# Patient Record
Sex: MALE | Race: WHITE | NOT HISPANIC OR LATINO | Employment: FULL TIME | URBAN - NONMETROPOLITAN AREA
[De-identification: names, ages, dates, MRNs, and addresses within clinical notes are randomized per-mention and may not be internally consistent; named-entity substitution may affect disease eponyms.]

---

## 2020-10-08 NOTE — PROGRESS NOTES
"Subjective     Teddy Donis is a 29 year old male who presents to clinic today for the following health issues:    HPI         New Patient/Transfer of Care    Musculoskeletal problem/pain      Duration: years    Description  Location: bilateral wrist/hand    Intensity:  moderate    Accompanying signs and symptoms: numbness and trouble grasping    History  Previous similar problem: no   Previous evaluation:  none    Precipitating or alleviating factors:  Trauma or overuse: YES-overuse  Aggravating factors include: overuse    Therapies tried and outcome: nothing           Review of Systems   Constitutional, HEENT, cardiovascular, pulmonary, gi and gu systems are negative, except as otherwise noted.      Objective    /60   Pulse 73   Temp 97.3  F (36.3  C)   Ht 1.854 m (6' 1\")   Wt 106.6 kg (235 lb)   SpO2 99%   BMI 31.00 kg/m    Body mass index is 31 kg/m .  Physical Exam   GENERAL: healthy, alert and no distress  NECK: no adenopathy, no asymmetry, masses, or scars and thyroid normal to palpation  RESP: lungs clear to auscultation - no rales, rhonchi or wheezes  CV: regular rate and rhythm, normal S1 S2, no S3 or S4, no murmur, click or rub, no peripheral edema and peripheral pulses strong  ABDOMEN: soft, nontender, no hepatosplenomegaly, no masses and bowel sounds normal  MS: no gross musculoskeletal defects noted, no edema    Labs pending.         Assessment & Plan     Encounter for vasectomy  Recommend Dr. Og.  Appt. Made.    - UROLOGY ADULT REFERRAL; Future    Family history of ischemic heart disease  Reviewed major risk factors.  Stressed the tobacco.  Getting non fasting labs.    - Lipid Profile (Chol, Trig, HDL, LDL calc)  - Comprehensive metabolic panel (BMP + Alb, Alk Phos, ALT, AST, Total. Bili, TP)    Tobacco abuse  Stressed and sent the lozenges.    - nicotine (NICORETTE) 4 MG lozenge; Place 1 lozenge (4 mg) inside cheek as needed for smoking cessation    Bilateral hand pain  Reviewed at " "some length.  More of an arthritic, but consider CTS.  With am sx most significant, r/o RA as well as consider CTS and OA.  Educated on this, offered emg, night splint, and he will just try and figure out the problem first.    - ESR: Erythrocyte sedimentation rate  - Cyclic Citrullinated Peptide Antibody IgG  - Rheumatoid factor     BMI:   Estimated body mass index is 31 kg/m  as calculated from the following:    Height as of this encounter: 1.854 m (6' 1\").    Weight as of this encounter: 106.6 kg (235 lb).                No follow-ups on file.    Martin Greenfield MD  [facility]    "

## 2020-10-20 ENCOUNTER — OFFICE VISIT (OUTPATIENT)
Dept: FAMILY MEDICINE | Facility: OTHER | Age: 30
End: 2020-10-20
Attending: FAMILY MEDICINE
Payer: COMMERCIAL

## 2020-10-20 VITALS
OXYGEN SATURATION: 99 % | SYSTOLIC BLOOD PRESSURE: 106 MMHG | TEMPERATURE: 97.3 F | BODY MASS INDEX: 31.14 KG/M2 | HEIGHT: 73 IN | DIASTOLIC BLOOD PRESSURE: 60 MMHG | HEART RATE: 73 BPM | WEIGHT: 235 LBS

## 2020-10-20 DIAGNOSIS — M79.641 BILATERAL HAND PAIN: ICD-10-CM

## 2020-10-20 DIAGNOSIS — Z72.0 TOBACCO ABUSE: ICD-10-CM

## 2020-10-20 DIAGNOSIS — M79.642 BILATERAL HAND PAIN: ICD-10-CM

## 2020-10-20 DIAGNOSIS — Z30.2 ENCOUNTER FOR VASECTOMY: Primary | ICD-10-CM

## 2020-10-20 DIAGNOSIS — Z82.49 FAMILY HISTORY OF ISCHEMIC HEART DISEASE: ICD-10-CM

## 2020-10-20 LAB — ERYTHROCYTE [SEDIMENTATION RATE] IN BLOOD BY WESTERGREN METHOD: 1 MM/H (ref 0–15)

## 2020-10-20 PROCEDURE — 86200 CCP ANTIBODY: CPT | Performed by: FAMILY MEDICINE

## 2020-10-20 PROCEDURE — 99204 OFFICE O/P NEW MOD 45 MIN: CPT | Performed by: FAMILY MEDICINE

## 2020-10-20 PROCEDURE — 80061 LIPID PANEL: CPT | Performed by: FAMILY MEDICINE

## 2020-10-20 PROCEDURE — 86431 RHEUMATOID FACTOR QUANT: CPT | Performed by: FAMILY MEDICINE

## 2020-10-20 PROCEDURE — 36415 COLL VENOUS BLD VENIPUNCTURE: CPT | Performed by: FAMILY MEDICINE

## 2020-10-20 PROCEDURE — 80053 COMPREHEN METABOLIC PANEL: CPT | Performed by: FAMILY MEDICINE

## 2020-10-20 PROCEDURE — 85652 RBC SED RATE AUTOMATED: CPT | Performed by: FAMILY MEDICINE

## 2020-10-20 SDOH — HEALTH STABILITY: MENTAL HEALTH: HOW OFTEN DO YOU HAVE 6 OR MORE DRINKS ON ONE OCCASION?: WEEKLY

## 2020-10-20 SDOH — HEALTH STABILITY: MENTAL HEALTH: HOW MANY STANDARD DRINKS CONTAINING ALCOHOL DO YOU HAVE ON A TYPICAL DAY?: NOT ASKED

## 2020-10-20 SDOH — HEALTH STABILITY: MENTAL HEALTH: HOW OFTEN DO YOU HAVE A DRINK CONTAINING ALCOHOL?: NOT ASKED

## 2020-10-20 ASSESSMENT — MIFFLIN-ST. JEOR: SCORE: 2089.83

## 2020-10-20 ASSESSMENT — PAIN SCALES - GENERAL: PAINLEVEL: NO PAIN (0)

## 2020-10-20 NOTE — NURSING NOTE
"Chief Complaint   Patient presents with     Establish Care       Initial /60   Pulse 73   Temp 97.3  F (36.3  C)   Ht 1.854 m (6' 1\")   Wt 106.6 kg (235 lb)   SpO2 99%   BMI 31.00 kg/m   Estimated body mass index is 31 kg/m  as calculated from the following:    Height as of this encounter: 1.854 m (6' 1\").    Weight as of this encounter: 106.6 kg (235 lb).  Medication Reconciliation: complete  Amy Keene LPN  "

## 2020-10-21 LAB
ALBUMIN SERPL-MCNC: 4.2 G/DL (ref 3.4–5)
ALP SERPL-CCNC: 46 U/L (ref 40–150)
ALT SERPL W P-5'-P-CCNC: 36 U/L (ref 0–70)
ANION GAP SERPL CALCULATED.3IONS-SCNC: 6 MMOL/L (ref 3–14)
AST SERPL W P-5'-P-CCNC: 13 U/L (ref 0–45)
BILIRUB SERPL-MCNC: 0.6 MG/DL (ref 0.2–1.3)
BUN SERPL-MCNC: 12 MG/DL (ref 7–30)
CALCIUM SERPL-MCNC: 8.8 MG/DL (ref 8.5–10.1)
CHLORIDE SERPL-SCNC: 107 MMOL/L (ref 94–109)
CHOLEST SERPL-MCNC: 137 MG/DL
CO2 SERPL-SCNC: 26 MMOL/L (ref 20–32)
CREAT SERPL-MCNC: 0.87 MG/DL (ref 0.66–1.25)
GFR SERPL CREATININE-BSD FRML MDRD: >90 ML/MIN/{1.73_M2}
GLUCOSE SERPL-MCNC: 85 MG/DL (ref 70–99)
HDLC SERPL-MCNC: 48 MG/DL
LDLC SERPL CALC-MCNC: 63 MG/DL
NONHDLC SERPL-MCNC: 89 MG/DL
POTASSIUM SERPL-SCNC: 3.7 MMOL/L (ref 3.4–5.3)
PROT SERPL-MCNC: 7.5 G/DL (ref 6.8–8.8)
SODIUM SERPL-SCNC: 139 MMOL/L (ref 133–144)
TRIGL SERPL-MCNC: 130 MG/DL

## 2020-10-22 LAB
CCP AB SER IA-ACNC: 1 U/ML
RHEUMATOID FACT SER NEPH-ACNC: <7 IU/ML (ref 0–20)

## 2020-11-02 ENCOUNTER — NURSE TRIAGE (OUTPATIENT)
Dept: FAMILY MEDICINE | Facility: OTHER | Age: 30
End: 2020-11-02

## 2020-11-02 ENCOUNTER — OFFICE VISIT (OUTPATIENT)
Dept: FAMILY MEDICINE | Facility: OTHER | Age: 30
End: 2020-11-02
Attending: FAMILY MEDICINE
Payer: COMMERCIAL

## 2020-11-02 DIAGNOSIS — Z20.822 COVID-19 RULED OUT: Primary | ICD-10-CM

## 2020-11-02 DIAGNOSIS — Z20.822 COVID-19 RULED OUT: ICD-10-CM

## 2020-11-02 PROCEDURE — U0003 INFECTIOUS AGENT DETECTION BY NUCLEIC ACID (DNA OR RNA); SEVERE ACUTE RESPIRATORY SYNDROME CORONAVIRUS 2 (SARS-COV-2) (CORONAVIRUS DISEASE [COVID-19]), AMPLIFIED PROBE TECHNIQUE, MAKING USE OF HIGH THROUGHPUT TECHNOLOGIES AS DESCRIBED BY CMS-2020-01-R: HCPCS | Performed by: FAMILY MEDICINE

## 2020-11-02 NOTE — TELEPHONE ENCOUNTER
"Cough (productive) and sinus congestion, no drainage. Pt's work is requesting for pt to be tested for covid 19.     Symptoms starting on 10/28/20.    Covid testing scheduled:    Next 5 appointments (look out 90 days)    Nov 02, 2020 10:10 AM  (Arrive by 9:55 AM)  [facility]  [facility] ) [address]  [facility] [address]  [phone]        Discharge Instructions for COVID-19 Patients  You have--or may have--COVID-19. Please follow the instructions listed below.   If you have a weakened immune system, discuss with your doctor any other actions you need to take.  How can I protect others?  If you have symptoms (fever, cough, body aches or trouble breathing):    Stay home and away from others (self-isolate) until:  ? At least 10 days have passed since your symptoms started (if positive for covid 19)  And   ? You've had no fever--and no medicine that reduces fever--for 1 full day (24 hours), And    ? Your other symptoms have resolved (gotten better).  If you don't show symptoms, but testing showed that you have COVID-19:    Stay home and away from others (self-isolate). Follow the tips under \"How do I self-isolate?\" below for 10 days (20 days if you have a weak immune system).    You don't need to be retested for COVID-19 before going back to school or work. As long as you're fever-free and feeling better, you can go back to school, work and other activities after waiting the 10 or 20 days.   How do I self-isolate?    Stay in your own room, even for meals. Use your own bathroom if you can.    Stay away from others in your home. No hugging, kissing or shaking hands. No visitors.    Don't go to work, school or anywhere else.    Clean \"high touch\" surfaces often (doorknobs, counters, handles). Use household cleaning spray or wipes. You'll find a full list of  on the EPA website: " www.epa.gov/pesticide-registration/list-n-disinfectants-use-against-sars-cov-2.    Cover your mouth and nose with a mask or other face covering to avoid spreading germs.    Wash your hands and face often. Use soap and water.    Caregivers in these groups are at risk for severe illness due to COVID-19:  ? People 65 years and older  ? People who live in a nursing home or long-term care facility  ? People with chronic disease (lung, heart, cancer, diabetes, kidney, liver, immunologic)  ? People who have a weakened immune system, including those who:    Are in cancer treatment    Take medicine that weakens the immune system, such as corticosteroids    Had a bone marrow or organ transplant    Have an immune deficiency    Have poorly controlled HIV or AIDS    Are obese (body mass index of 40 or higher)    Smoke regularly    Caregivers should wear gloves while washing dishes, handling laundry and cleaning bedrooms and bathrooms.    Use caution when washing and drying laundry: Don't shake dirty laundry and use the warmest water setting that you can.    For more tips on managing your health at home, go to www.cdc.gov/coronavirus/2019-ncov/downloads/10Things.pdf.  How can I take care of myself at home?  1. Get lots of rest. Drink extra fluids (unless a doctor has told you not to).    2. Take Tylenol (acetaminophen) for fever or pain. If you have liver or kidney problems, ask your family doctor if it's okay to take Tylenol.     Adults can take either:  ? 650 mg (two 325 mg pills) every 4 to 6 hours, or   ? 1,000 mg (two 500 mg pills) every 8 hours as needed.  ? Note: Don't take more than 3,000 mg in one day. Acetaminophen is found in many medicines (both prescribed and over-the-counter medicines). Read all labels to be sure you don't take too much.   For children, check the Tylenol bottle for the right dose. The dose is based on the child's age or weight.  3. If you have other health problems (like cancer, heart failure, an  organ transplant or severe kidney disease): Call your specialty clinic if you don't feel better in the next 2 days.    4. Know when to call 911. Emergency warning signs include:  ? Trouble breathing or shortness of breath  ? Pain or pressure in the chest that doesn't go away  ? Feeling confused like you haven't felt before, or not being able to wake up  ? Bluish-colored lips or face    5. Your doctor may have prescribed a blood thinner medicine. Follow their instructions.  Where can I get more information?    [facility] - About COVID-19: [email or web address]    CDC - What to Do If You're Sick: www.cdc.gov/coronavirus/2019-ncov/about/steps-when-sick.html    CDC - Ending Home Isolation: www.cdc.gov/coronavirus/2019-ncov/hcp/disposition-in-home-patients.html    CDC - Caring for Someone: www.cdc.gov/coronavirus/2019-ncov/if-you-are-sick/care-for-someone.html    [facility] - Interim Guidance for Hospital Discharge to Home: www.[facility].[place]./diseases/coronavirus/hcp/hospdischarge.pdf    [facility] clinical trials (COVID-19 research studies): clinicalaffairs.[facility]-clinical-trials    Below are the COVID-19 hotlines at the [place] Department of Health ([facility]). Interpreters are available.  ? For health questions: Call 653-296-3480 or 1-600.724.3117 (7 a.m. to 7 p.m.)  ? For questions about schools and childcare: Call 638-195-9490 or 1-820.758.8432 (7 a.m. to 7 p.m.)    For informational purposes only. Not to replace the advice of your health care provider. Clinically reviewed by the Infection Prevention Team. Copyright   2020 [employer]. All rights reserved. [employer] 279776 - REV 08/04/20.      Reason for Disposition    COVID-19 Home Isolation, questions about    Additional Information    Negative: SEVERE difficulty breathing (e.g., struggling for each breath, speaks in single words)    Negative: Difficult to awaken or acting confused (e.g., disoriented, slurred speech)     Negative: Bluish (or gray) lips or face now    Negative: Shock suspected (e.g., cold/pale/clammy skin, too weak to stand, low BP, rapid pulse)    Negative: Sounds like a life-threatening emergency to the triager    Negative: [1] COVID-19 exposure AND [2] no symptoms    Negative: COVID-19 and Breastfeeding, questions about    Negative: [1] Adult with possible COVID-19 symptoms AND [2] triager concerned about severity of symptoms or other causes    Negative: Patient sounds very sick or weak to the triager    Negative: SEVERE or constant chest pain or pressure (Exception: mild central chest pain, present only when coughing)    Negative: MODERATE difficulty breathing (e.g., speaks in phrases, SOB even at rest, pulse 100-120)    Negative: MILD difficulty breathing (e.g., minimal/no SOB at rest, SOB with walking, pulse <100)    Negative: Chest pain or pressure    Negative: Fever > 103 F (39.4 C)    Negative: [1] Fever > 101 F (38.3 C) AND [2] age > 60    Negative: [1] Fever > 100.0 F (37.8 C) AND [2] bedridden (e.g., nursing home patient, CVA, chronic illness, recovering from surgery)    Negative: HIGH RISK patient (e.g., age > 64 years, diabetes, heart or lung disease, weak immune system) (Exception: Has already been evaluated by healthcare provider and has no new or worsening symptoms)    Negative: Fever present > 3 days (72 hours)    Negative: [1] Fever returns after gone for over 24 hours AND [2] symptoms worse or not improved    Negative: [1] Continuous (nonstop) coughing interferes with work or school AND [2] no improvement using cough treatment per protocol    Negative: [1] COVID-19 infection suspected by caller or triager AND [2] mild symptoms (cough, fever, or others) AND [3] no complications or SOB    Negative: Cough present > 3 weeks    Negative: [1] COVID-19 diagnosed by positive lab test AND [2] mild symptoms (e.g., cough, fever, others) AND [3] no complications or SOB    Negative: [1] COVID-19 diagnosed by  "HCP (doctor, NP or PA) AND [2] mild symptoms (e.g., cough, fever, others) AND [3] no complications or SOB    Answer Assessment - Initial Assessment Questions  1. COVID-19 DIAGNOSIS: \"Who made your Coronavirus (COVID-19) diagnosis?\" \"Was it confirmed by a positive lab test?\" If not diagnosed by a HCP, ask \"Are there lots of cases (community spread) where you live?\" (See public health department website, if unsure)      Pt has not been tested for covid 19    2. ONSET: \"When did the COVID-19 symptoms start?\"       10/28/20    3. WORST SYMPTOM: \"What is your worst symptom?\" (e.g., cough, fever, shortness of breath, muscle aches)      Cough    4. COUGH: \"Do you have a cough?\" If so, ask: \"How bad is the cough?\"        Yes, productive    5. FEVER: \"Do you have a fever?\" If so, ask: \"What is your temperature, how was it measured, and when did it start?\"      No     6. RESPIRATORY STATUS: \"Describe your breathing?\" (e.g., shortness of breath, wheezing, unable to speak)       No    7. BETTER-SAME-WORSE: \"Are you getting better, staying the same or getting worse compared to yesterday?\"  If getting worse, ask, \"In what way?\"      Better    8. HIGH RISK DISEASE: \"Do you have any chronic medical problems?\" (e.g., asthma, heart or lung disease, weak immune system, etc.)      No     9. PREGNANCY: \"Is there any chance you are pregnant?\" \"When was your last menstrual period?\"      NA    10. OTHER SYMPTOMS: \"Do you have any other symptoms?\"  (e.g., chills, fatigue, headache, loss of smell or taste, muscle pain, sore throat)        Cough, sinus congestion    Protocols used: CORONAVIRUS (COVID-19) DIAGNOSED OR [id number] 8.4.20      "

## 2020-11-03 LAB
SARS-COV-2 RNA SPEC QL NAA+PROBE: NOT DETECTED
SPECIMEN SOURCE: NORMAL

## 2020-12-02 ENCOUNTER — VIRTUAL VISIT (OUTPATIENT)
Dept: UROLOGY | Facility: OTHER | Age: 30
End: 2020-12-02
Attending: UROLOGY
Payer: COMMERCIAL

## 2020-12-02 VITALS — WEIGHT: 230 LBS | BODY MASS INDEX: 30.48 KG/M2 | HEIGHT: 73 IN

## 2020-12-02 DIAGNOSIS — Z30.2 ENCOUNTER FOR VASECTOMY: ICD-10-CM

## 2020-12-02 PROCEDURE — 99203 OFFICE O/P NEW LOW 30 MIN: CPT | Mod: 95 | Performed by: UROLOGY

## 2020-12-02 ASSESSMENT — MIFFLIN-ST. JEOR: SCORE: 2067.15

## 2020-12-02 ASSESSMENT — PAIN SCALES - GENERAL: PAINLEVEL: NO PAIN (0)

## 2020-12-02 NOTE — NURSING NOTE
"Chief Complaint   Patient presents with     Consult For       Initial Ht 1.854 m (6' 1\")   Wt 104.3 kg (230 lb)   BMI 30.34 kg/m   Estimated body mass index is 30.34 kg/m  as calculated from the following:    Height as of this encounter: 1.854 m (6' 1\").    Weight as of this encounter: 104.3 kg (230 lb).  Medication Reconciliation: complete  Dianna Benedict LPN    Review of Systems:    Weight loss:    No     Recent fever/chills:  No   Night sweats:   No  Current skin rash:  No   Recent hair loss:  No  Heat intolerance:  No   Cold intolerance:  No  Chest pain:   No   Palpitations:   No  Shortness of breath:  No   Wheezing:   No  Constipation:    No   Diarrhea:   No   Nausea:   No   Vomiting:   No   Kidney/side pain:  No   Back pain:   No  Frequent headaches:  No   Dizziness:     No  Leg swelling:   No   Calf pain:    No    Parents, brothers or sisters with history of kidney cancer:   No  Parents, brothers or sisters with history of bladder cancer: No    "

## 2020-12-02 NOTE — PROGRESS NOTES
"  History     Chief Complaint:    Consult For      HPI   Teddy Donis is a 29 year old male whom I visited with on the phone regarding a vasectomy.  Teddy is  with 2 children he has a 4-year-old son and a 4-month-old daughter.  He has been thinking about vasectomy for probably at least 3 years.  He does have a history of having some type of infection which he thought was in the testicle about 6 years ago.  This occurred after he was splashed by some type of a chemical at work.  He was tested for STDs which was negative and eventually that resolved and he has not had any problems with urination or urine infections or any other testicular infections.  No testicular surgeries.  He does not have any pain in his testicles at all.  He does not feel that he would have any difficulty with an office procedure    Allergies:    Allergies   Allergen Reactions     Amoxicillin GI Disturbance        Medications:           nicotine (NICORETTE) 4 MG lozenge        Problem List:      There are no active problems to display for this patient.       Past Medical History:      History reviewed. No pertinent past medical history.    Past Surgical History:      History reviewed. No pertinent surgical history.    Family History:      Family History   Problem Relation Age of Onset     Myocardial Infarction Mother 55     Hypertension Mother      Alcoholism Father        Social History:    Marital Status:   [2]  Social History     Tobacco Use     Smoking status: Former Smoker     Types: Cigarettes     Smokeless tobacco: Current User     Types: Chew   Substance Use Topics     Alcohol use: Yes     Binge frequency: Weekly     Drug use: None        Review of Systems      Physical Exam   Vitals:  Ht 1.854 m (6' 1\")   Wt 104.3 kg (230 lb)   BMI 30.34 kg/m        Physical Exam    Impression: Ectomy encounter  Plan   Plan: I told Teddy that prior to his vasectomy which it sounds like he be able to do in the office we will do an " examination.  It is unlikely that I would not do the procedure in the office but he needs to understand that some patients anatomy can be challenging and would be better served to be done in the OR.  He does not have any problems with doing this in the office so the plan would hopefully be able to do this in the office.  I discussed the procedure with him.  Discussed reanastomosis rate in 1 and 1500, testicular hematoma or bleeding, testicular infection, sperm granuloma, and a orchialgia which can occur years down the road.  He is a  so we also discussed just length of time he would need to take it easy afterwards.  Also discussed that we send a portion of the vas to the pathologist for identification.  He like to get this done before the end of the year so he will call us back with some possible dates that will work.  17 minutes spent in the phone consultation      No follow-ups on file.    Kelle Og MD  [facility]

## 2020-12-02 NOTE — PROGRESS NOTES
"Teddy Donis is a 29 year old male who is being evaluated via a billable telephone visit.      The patient has been notified of following:     \"This telephone visit will be conducted via a call between you and your physician/provider. We have found that certain health care needs can be provided without the need for a physical exam.  This service lets us provide the care you need with a short phone conversation.  If a prescription is necessary we can send it directly to your pharmacy.  If lab work is needed we can place an order for that and you can then stop by our lab to have the test done at a later time.    Telephone visits are billed at different rates depending on your insurance coverage. During this emergency period, for some insurers they may be billed the same as an in-person visit.  Please reach out to your insurance provider with any questions.    If during the course of the call the physician/provider feels a telephone visit is not appropriate, you will not be charged for this service.\"    Patient has given verbal consent for Telephone visit?  Yes    What phone number would you like to be contacted at? [phone]    How would you like to obtain your AVS? Justin    Phone call duration: 18 minutes          "

## 2020-12-21 ENCOUNTER — OFFICE VISIT (OUTPATIENT)
Dept: UROLOGY | Facility: OTHER | Age: 30
End: 2020-12-21
Attending: UROLOGY
Payer: COMMERCIAL

## 2020-12-21 VITALS
TEMPERATURE: 98 F | BODY MASS INDEX: 30.48 KG/M2 | OXYGEN SATURATION: 98 % | HEIGHT: 73 IN | HEART RATE: 80 BPM | SYSTOLIC BLOOD PRESSURE: 120 MMHG | WEIGHT: 230 LBS | DIASTOLIC BLOOD PRESSURE: 70 MMHG

## 2020-12-21 DIAGNOSIS — Z30.2 ENCOUNTER FOR VASECTOMY: Primary | ICD-10-CM

## 2020-12-21 LAB
ALBUMIN UR-MCNC: NEGATIVE MG/DL
APPEARANCE UR: CLEAR
BACTERIA #/AREA URNS HPF: ABNORMAL /HPF
BILIRUB UR QL STRIP: NEGATIVE
COLOR UR AUTO: ABNORMAL
GLUCOSE UR STRIP-MCNC: NEGATIVE MG/DL
HGB UR QL STRIP: NEGATIVE
KETONES UR STRIP-MCNC: NEGATIVE MG/DL
LEUKOCYTE ESTERASE UR QL STRIP: NEGATIVE
MUCOUS THREADS #/AREA URNS LPF: PRESENT /LPF
NITRATE UR QL: NEGATIVE
PH UR STRIP: 7 PH (ref 4.7–8)
RBC #/AREA URNS AUTO: <1 /HPF (ref 0–2)
SOURCE: ABNORMAL
SP GR UR STRIP: 1.02 (ref 1–1.03)
SQUAMOUS #/AREA URNS AUTO: 0 /HPF (ref 0–1)
UROBILINOGEN UR STRIP-MCNC: NORMAL MG/DL (ref 0–2)
WBC #/AREA URNS AUTO: <1 /HPF (ref 0–5)

## 2020-12-21 PROCEDURE — 55250 REMOVAL OF SPERM DUCT(S): CPT | Performed by: UROLOGY

## 2020-12-21 PROCEDURE — 88304 TISSUE EXAM BY PATHOLOGIST: CPT | Mod: TC,59 | Performed by: UROLOGY

## 2020-12-21 PROCEDURE — 81001 URINALYSIS AUTO W/SCOPE: CPT | Performed by: UROLOGY

## 2020-12-21 RX ORDER — HYDROCODONE BITARTRATE AND ACETAMINOPHEN 5; 325 MG/1; MG/1
1 TABLET ORAL EVERY 6 HOURS PRN
Qty: 8 TABLET | Refills: 0 | Status: SHIPPED | OUTPATIENT
Start: 2020-12-21 | End: 2020-12-24

## 2020-12-21 ASSESSMENT — PAIN SCALES - GENERAL: PAINLEVEL: NO PAIN (0)

## 2020-12-21 ASSESSMENT — MIFFLIN-ST. JEOR: SCORE: 2067.15

## 2020-12-21 NOTE — LETTER
December 23, 2020      Teddy Gallagher  [address] [address]  [place] [address]        Dear ,    We are writing to inform you of your test results.        Resulted Orders   UA reflex to Microscopic and Culture - [place]   Result Value Ref Range    Color Urine Light Yellow     Appearance Urine Clear     Glucose Urine Negative NEG^Negative mg/dL    Bilirubin Urine Negative NEG^Negative    Ketones Urine Negative NEG^Negative mg/dL    Specific Gravity Urine 1.017 1.003 - 1.035    Blood Urine Negative NEG^Negative    pH Urine 7.0 4.7 - 8.0 pH    Protein Albumin Urine Negative NEG^Negative mg/dL    Urobilinogen mg/dL Normal 0.0 - 2.0 mg/dL    Nitrite Urine Negative NEG^Negative    Leukocyte Esterase Urine Negative NEG^Negative    Source Midstream Urine     RBC Urine <1 0 - 2 /HPF    WBC Urine <1 0 - 5 /HPF    Bacteria Urine None (A) NEG^Negative /HPF    Squamous Epithelial /HPF Urine 0 0 - 1 /HPF    Mucous Urine Present (A) NEG^Negative /LPF   Surgical pathology exam   Result Value Ref Range    Copath Report       Patient Name: TEDDY GALLAGHER  MR#: [id number]  Specimen #: [id number]  Collected: 12/21/2020  Received: 12/22/2020  Reported: 12/23/2020 13:05  Ordering Phy(s): KARINA MELCHOR  Additional Phy(s): KEVAN BLACK    For improved result formatting, select 'View Enhanced Report Format' under   Linked Documents section.    SPECIMEN(S):  A: Vas deferens, left  B: Vas deferens, right    FINAL DIAGNOSIS:  A: Left vas deferens, vasectomy  - Vas deferens    B: Right vas deferens, vasectomy  - Vas deferens    I have personally reviewed all specimens and/or slides, including the   listed special stains, and used them  with my medical judgement to determine or confirm the final diagnosis.    Electronically signed out by:    Jovani Higgins M.D.    CLINICAL HISTORY:  Sterilization; encounter for vasectomy.    GROSS:  A: There is an 11 x 3 mm cylinder of tan soft tissue. (1 TE in 1 block).    B: There is an 11 x 3 mm  cylinder of tan soft tissue. (1 TE in 1 block).   (Dictated by: Jovani Higgins MD  1 2/22/2020 02:23 PM)    MICROSCOPIC:  A:  Microscopic sections show a vas deferens consisting of   pseudostratified columnar epithelium surrounded by  smooth muscle.    B:  Microscopic sections show a vas deferens consisting of   pseudostratified columnar epithelium surrounded by  smooth muscle.    CPT Codes  A: 80275-KH0  B: 25145-ZK9    COLLECTION SITE:  Client: [facility]  Location: [facility] (B)    The technical component of this testing was completed at the [facility], with the  professional component performed at the [facility], [address] [place] [address]  ([phone])           If you have any questions or concerns, please call the clinic at the number listed above.     Pathology all looks good!  Sincerely,        Kelle Og MD

## 2020-12-21 NOTE — PROGRESS NOTES
"  History     Chief Complaint:    Sterilization      HPI   Teddy Donis is a 29 year old male who presents for a vasectomy.  He has no further questions and wishes to proceed.    Allergies:    Allergies   Allergen Reactions     Amoxicillin GI Disturbance        Medications:           nicotine (NICORETTE) 4 MG lozenge        Problem List:      There are no active problems to display for this patient.       Past Medical History:      History reviewed. No pertinent past medical history.    Past Surgical History:      History reviewed. No pertinent surgical history.    Family History:      Family History   Problem Relation Age of Onset     Myocardial Infarction Mother 55     Hypertension Mother      Alcoholism Father        Social History:    Marital Status:   [2]  Social History     Tobacco Use     Smoking status: Former Smoker     Types: Cigarettes     Smokeless tobacco: Current User     Types: Chew   Substance Use Topics     Alcohol use: Yes     Binge frequency: Weekly     Drug use: None        Review of Systems   All other systems reviewed and are negative.        Physical Exam   Vitals:  /70 (BP Location: Left arm, Patient Position: Chair, Cuff Size: Adult Regular)   Pulse 80   Temp 98  F (36.7  C) (Tympanic)   Ht 1.854 m (6' 1\")   Wt 104.3 kg (230 lb)   SpO2 98%   BMI 30.34 kg/m        Physical Exam  Genitourinary:     Comments: Testicles are smooth without any masses. Vas are both identified and easily brought to the skin. No inguinal hernias.       Procedure: Identification of the patient and proper procedure was done.  Patient had no further questions and consent is signed.  Patient's genitalia were prepped and draped in the usual sterile fashion.  The left vas deferens was isolated to the skin I anesthetized the skin with 1% lidocaine and a small incision was made.  Dissection was carried down to the vas sheath and the vas sheath was grasped with a vas clamp.  The vas sheath was then " opened and the vas deferens was isolated outside of the vas sheath for a portion of about a centimeter and a half.  The vas was then clamped and a portion of the vas deferens was excised.  Lumens were identified and cauterized.  I then tied both ends of the vas with 3-0 chromic, buried 1 portion of the vas back into the vas sheath cauterized any bleeding.  I then allowed the vas deferens to retract back into the scrotum and I closed the skin with interrupted 3-0 chromic.  Was completed on the left side first and then we went to the right side with the same procedure.  Both vas deferens were sent for pathologic identification.  Patient tolerated the procedure well.    Impression: Vasectomy encounter    Plan   Plan: Patient again was counseled to use ice, take it easy and avoid heavy lifting and activities.  He is going to expect some swelling but if he has any significant problems with bleeding or infection or swelling he is to let us know.  He still considered fertile until he brings in 2 - semen analyses and those instructions were given.  We will notify him once we have his pathology report and he can contact us if he is having any problems.      No follow-ups on file.    Kelle Og MD  [facility]

## 2020-12-21 NOTE — NURSING NOTE
"Chief Complaint   Patient presents with     Sterilization       Initial /70 (BP Location: Left arm, Patient Position: Chair, Cuff Size: Adult Regular)   Pulse 80   Temp 98  F (36.7  C) (Tympanic)   Ht 1.854 m (6' 1\")   Wt 104.3 kg (230 lb)   SpO2 98%   BMI 30.34 kg/m   Estimated body mass index is 30.34 kg/m  as calculated from the following:    Height as of this encounter: 1.854 m (6' 1\").    Weight as of this encounter: 104.3 kg (230 lb).  Medication Reconciliation: complete  SHERRY BROWN LPN    "

## 2020-12-23 LAB — COPATH REPORT: NORMAL

## 2021-04-26 ENCOUNTER — OFFICE VISIT (OUTPATIENT)
Dept: FAMILY MEDICINE | Facility: OTHER | Age: 31
End: 2021-04-26
Attending: FAMILY MEDICINE
Payer: COMMERCIAL

## 2021-04-26 ENCOUNTER — TELEPHONE (OUTPATIENT)
Dept: FAMILY MEDICINE | Facility: OTHER | Age: 31
End: 2021-04-26

## 2021-04-26 DIAGNOSIS — Z20.822 COVID-19 RULED OUT: Primary | ICD-10-CM

## 2021-04-26 DIAGNOSIS — Z20.822 COVID-19 RULED OUT: ICD-10-CM

## 2021-04-26 PROCEDURE — U0003 INFECTIOUS AGENT DETECTION BY NUCLEIC ACID (DNA OR RNA); SEVERE ACUTE RESPIRATORY SYNDROME CORONAVIRUS 2 (SARS-COV-2) (CORONAVIRUS DISEASE [COVID-19]), AMPLIFIED PROBE TECHNIQUE, MAKING USE OF HIGH THROUGHPUT TECHNOLOGIES AS DESCRIBED BY CMS-2020-01-R: HCPCS | Performed by: FAMILY MEDICINE

## 2021-04-26 PROCEDURE — U0005 INFEC AGEN DETEC AMPLI PROBE: HCPCS | Performed by: FAMILY MEDICINE

## 2021-04-26 NOTE — TELEPHONE ENCOUNTER
Triage Call    Chief complaint:  Covid symptoms and exprosure    Duration (How long symptoms or problem have been present): Started 04/24/2021    Have you been seen for this before: no    Are you wanting an appointment for this today: yes    Primary care provider: Jean Pierre                If provider is out is patient fine with seeing covering provider: yes just wants testing    Phone number: [phone]    Expected time to hear from RN: Within 30 minutes

## 2021-04-27 LAB
LABORATORY COMMENT REPORT: ABNORMAL
SARS-COV-2 RNA RESP QL NAA+PROBE: NORMAL
SARS-COV-2 RNA RESP QL NAA+PROBE: POSITIVE
SPECIMEN SOURCE: ABNORMAL
SPECIMEN SOURCE: NORMAL

## 2021-06-12 ENCOUNTER — HEALTH MAINTENANCE LETTER (OUTPATIENT)
Age: 31
End: 2021-06-12

## 2021-10-03 ENCOUNTER — HEALTH MAINTENANCE LETTER (OUTPATIENT)
Age: 31
End: 2021-10-03

## 2021-11-29 ENCOUNTER — APPOINTMENT (OUTPATIENT)
Dept: CHIROPRACTIC MEDICINE | Facility: OTHER | Age: 31
End: 2021-11-29

## 2021-11-29 ENCOUNTER — APPOINTMENT (OUTPATIENT)
Dept: OCCUPATIONAL MEDICINE | Facility: OTHER | Age: 31
End: 2021-11-29

## 2022-01-31 NOTE — PROGRESS NOTES
Assessment & Plan     Tobacco abuse  Update mints.  Chewing tobacco.  Encouraged cessation.    - nicotine (NICORETTE) 4 MG lozenge; Place 1 lozenge (4 mg) inside cheek every hour as needed for smoking cessation    Paresthesia of both hands  Reviewed.  Hx suggestive of CTS, bilaterally.  Suggest EMG.  Offer braces, but he will hold off for now.    - EMG; Future    Daytime sleepiness  Reviewed.  Question sleep quality.  No red flags.  Consider apnea vs. Other.  Getting sleep eval with the sx of daytime sleepiness despite seemingly adequate sleep.          30 minutes spent on the date of the encounter doing chart review, patient visit and documentation            No follow-ups on file.    Martin Greenfield MD  [facility]    Angela Espinal is a 31 year old who presents for the following health issues     HPI     Hand problem - Bilateral       Duration: 2-3 years - worsening ove the last 12 months     Description (location/character/radiation): Bilateral hand falling asleep during the night, pain to top of both hands      Intensity:  mild    Accompanying signs and symptoms: None    History (similar episodes/previous evaluation): None    Precipitating or alleviating factors: Patient  by trade - forward rotation of wrist applying compression     Therapies tried and outcome: Massage, heat       Concern - Fatigue   Onset: Years//Ongoing     Description:   Daytime drowsiness     Intensity: mild    Progression of Symptoms:  same    Accompanying Signs & Symptoms:  Patient states that he feels better on days he gets 4 hours of sleep vs days he gets 8-10 - Early morning waking     Previous history of similar problem:   None    Precipitating factors:   Worsened by: None    Alleviating factors:  Improved by: None    Therapies Tried and outcome: None          Review of Systems   Constitutional, HEENT, cardiovascular, pulmonary, gi and gu systems are negative, except as otherwise noted.       Objective    /70   Pulse 70   Temp 98.9  F (37.2  C)   Resp 18   Wt 103.7 kg (228 lb 9.6 oz)   SpO2 98%   BMI 30.16 kg/m    Body mass index is 30.16 kg/m .  Physical Exam   GENERAL: healthy, alert and no distress  NECK: no adenopathy, no asymmetry, masses, or scars and thyroid normal to palpation  RESP: lungs clear to auscultation - no rales, rhonchi or wheezes  CV: regular rate and rhythm, normal S1 S2, no S3 or S4, no murmur, click or rub, no peripheral edema and peripheral pulses strong  ABDOMEN: soft, nontender, no hepatosplenomegaly, no masses and bowel sounds normal  MS: no gross musculoskeletal defects noted, no edema    EMG and sleep eval ordered.  Mints for nicotine sent.  Nice review of all of this and how neuropathy/radiculopathy works.  Will await studies.  I told him they will call him.

## 2022-02-03 ENCOUNTER — OFFICE VISIT (OUTPATIENT)
Dept: FAMILY MEDICINE | Facility: OTHER | Age: 32
End: 2022-02-03
Attending: FAMILY MEDICINE
Payer: COMMERCIAL

## 2022-02-03 VITALS
DIASTOLIC BLOOD PRESSURE: 70 MMHG | BODY MASS INDEX: 30.16 KG/M2 | TEMPERATURE: 98.9 F | RESPIRATION RATE: 18 BRPM | OXYGEN SATURATION: 98 % | WEIGHT: 228.6 LBS | SYSTOLIC BLOOD PRESSURE: 118 MMHG | HEART RATE: 70 BPM

## 2022-02-03 DIAGNOSIS — Z72.0 TOBACCO ABUSE: ICD-10-CM

## 2022-02-03 DIAGNOSIS — R20.2 PARESTHESIA OF BOTH HANDS: Primary | ICD-10-CM

## 2022-02-03 DIAGNOSIS — R40.0 DAYTIME SLEEPINESS: ICD-10-CM

## 2022-02-03 PROCEDURE — 99214 OFFICE O/P EST MOD 30 MIN: CPT | Performed by: FAMILY MEDICINE

## 2022-02-03 ASSESSMENT — ANXIETY QUESTIONNAIRES
3. WORRYING TOO MUCH ABOUT DIFFERENT THINGS: NOT AT ALL
IF YOU CHECKED OFF ANY PROBLEMS ON THIS QUESTIONNAIRE, HOW DIFFICULT HAVE THESE PROBLEMS MADE IT FOR YOU TO DO YOUR WORK, TAKE CARE OF THINGS AT HOME, OR GET ALONG WITH OTHER PEOPLE: NOT DIFFICULT AT ALL
7. FEELING AFRAID AS IF SOMETHING AWFUL MIGHT HAPPEN: NOT AT ALL
4. TROUBLE RELAXING: SEVERAL DAYS
5. BEING SO RESTLESS THAT IT IS HARD TO SIT STILL: SEVERAL DAYS
2. NOT BEING ABLE TO STOP OR CONTROL WORRYING: SEVERAL DAYS
1. FEELING NERVOUS, ANXIOUS, OR ON EDGE: NOT AT ALL
6. BECOMING EASILY ANNOYED OR IRRITABLE: SEVERAL DAYS
GAD7 TOTAL SCORE: 4

## 2022-02-03 ASSESSMENT — PATIENT HEALTH QUESTIONNAIRE - PHQ9: SUM OF ALL RESPONSES TO PHQ QUESTIONS 1-9: 8

## 2022-02-03 ASSESSMENT — PAIN SCALES - GENERAL: PAINLEVEL: NO PAIN (0)

## 2022-02-03 NOTE — PATIENT INSTRUCTIONS
Patient Education     Resources to Help You Quit Smoking  If you have quit smoking or are thinking about quitting, congratulations! It can be hard to quit smoking, but the benefits are well worth it. To help you quit and stay smoke-free, there are many resources that can help.  Your health plan  If you have health insurance, call them for more details about their phone coaching programs.    Blue Cross and [facility]: 3-013-499-BLUE    [employer]: 4-403-587-QUIT    [facility]: 2-828-893-BLUE    [employer]: [phone]    [employer]: [phone]    [facility] members: [phone]    [facility]: [phone]    [facility]: [phone]    [facility]: [phone]  American Cancer Society: 1-144.679.7536  The American Cancer Society can help you find local resources to quit smoking.  [employer]IT[employer]: 1-475-796-PLAN (2403)  Offers a telephone helpline, gum, patches and lozenges. These services are free for the uninsured and those without coverage. The online program is free to everyone at www.INDOM.com.  American Lung Association: 9-365-YMOC-USA (988-8667)  Provides a lung helpline as well as an online program, self-help book and group clinic support for quitting smoking. www.lung.org/stop-smoking  [employer]: 5-514-244-QUIT (7465)  Offers a telephone hotline, online text chat and a website with tools, information and support for smokers who want to quit. www.smokefree.gov  Medication Therapy Management:  [phone] ([facility])  [phone] ([place])  This is a clinic program to help you quit smoking. It offers one-on-one sessions with a pharmacist.  [employer] Group Tobacco Cessation: [phone]  Small group sessions held throughout the [facility] area for people who are trying to live free from tobacco. www.[employer].org/exhale  Call to reserve your spot or for additional information.  For  informational purposes only. Not to replace the advice of your health care provider.  Copyright   2013 [employer]. All rights reserved. ISpeak 054963 - Rev 12/15.

## 2022-02-03 NOTE — NURSING NOTE
"Chief Complaint   Patient presents with     Musculoskeletal Problem     Depression     Anxiety     Fatigue       Initial /70   Pulse 70   Temp 98.9  F (37.2  C)   Resp 18   Wt 103.7 kg (228 lb 9.6 oz)   SpO2 98%   BMI 30.16 kg/m   Estimated body mass index is 30.16 kg/m  as calculated from the following:    Height as of 12/21/20: 1.854 m (6' 1\").    Weight as of this encounter: 103.7 kg (228 lb 9.6 oz).  Medication Reconciliation: milvia Ding  "

## 2022-02-04 ASSESSMENT — ANXIETY QUESTIONNAIRES: GAD7 TOTAL SCORE: 4

## 2022-02-18 DIAGNOSIS — G47.33 OSA (OBSTRUCTIVE SLEEP APNEA): Primary | ICD-10-CM

## 2022-03-03 ENCOUNTER — TRANSFERRED RECORDS (OUTPATIENT)
Dept: HEALTH INFORMATION MANAGEMENT | Facility: CLINIC | Age: 32
End: 2022-03-03

## 2022-04-01 ENCOUNTER — OFFICE VISIT (OUTPATIENT)
Dept: FAMILY MEDICINE | Facility: OTHER | Age: 32
End: 2022-04-01
Attending: FAMILY MEDICINE
Payer: COMMERCIAL

## 2022-04-01 DIAGNOSIS — Z20.822 COVID-19 RULED OUT: Primary | ICD-10-CM

## 2022-04-01 PROCEDURE — U0003 INFECTIOUS AGENT DETECTION BY NUCLEIC ACID (DNA OR RNA); SEVERE ACUTE RESPIRATORY SYNDROME CORONAVIRUS 2 (SARS-COV-2) (CORONAVIRUS DISEASE [COVID-19]), AMPLIFIED PROBE TECHNIQUE, MAKING USE OF HIGH THROUGHPUT TECHNOLOGIES AS DESCRIBED BY CMS-2020-01-R: HCPCS

## 2022-04-01 PROCEDURE — U0005 INFEC AGEN DETEC AMPLI PROBE: HCPCS

## 2022-04-02 LAB — SARS-COV-2 RNA RESP QL NAA+PROBE: NEGATIVE

## 2022-04-04 ENCOUNTER — THERAPY VISIT (OUTPATIENT)
Dept: SLEEP MEDICINE | Facility: HOSPITAL | Age: 32
End: 2022-04-04
Attending: FAMILY MEDICINE
Payer: COMMERCIAL

## 2022-04-04 DIAGNOSIS — G47.33 OSA (OBSTRUCTIVE SLEEP APNEA): ICD-10-CM

## 2022-04-04 LAB — SLPCOMP: NORMAL

## 2022-04-04 PROCEDURE — 95810 POLYSOM 6/> YRS 4/> PARAM: CPT | Mod: 26 | Performed by: FAMILY MEDICINE

## 2022-04-04 PROCEDURE — 95810 POLYSOM 6/> YRS 4/> PARAM: CPT

## 2022-04-06 NOTE — PROGRESS NOTES
Patient is here with complaint of Hypersomnia. Patint had delayed sleep onset due to being on the phone and laptop. Even when the patient went to sleep he left the lap top open and the moving light could be seen on the video. All stages of sleep were noted there was mild ot no snoring and an AHI of 0.9 SPO2 and heart rate remained normal and there was no limb movements. Patient tolerated test well.

## 2022-04-10 NOTE — PROCEDURES
" SLEEP STUDY INTERPRETATION  DIAGNOSTIC POLYSOMNOGRAPHY REPORT      Patient: ROLAND GALLAGHER  YOB: 1990  Study Date: 4/4/2022  MRN: [id number]  Referring Provider: Martin Greenfield MD  Ordering Provider: Jovani Soliz MD    Indications for Polysomnography: The patient is a 31 year old Male who is 6' 1\" and weighs 228.0 lbs. His BMI is 30.2, [place] sleepiness scale 5 and neck circumference is 15 cm. Relevant medical history includes obesity, tobacco use. A diagnostic polysomnogram was performed to evaluate for sleep apnea.    Polysomnogram Data: A full night polysomnogram recorded the standard physiologic parameters including EEG, EOG, EMG, ECG, nasal and oral airflow. Respiratory parameters of chest and abdominal movements were recorded with respiratory inductance plethysmography. Oxygen saturation was recorded by pulse oximetry. Hypopnea scoring rule used: 1B 4%.    Sleep Architecture: Delayed sleep latency (noted for use of phone / laptop in bed and laptop was kept on after SO), normal arousal index.  Supine REM was observed.  Staging at times challenging given sweat artifact.  The total recording time of the polysomnogram was 482.8 minutes. The total sleep time was 412.5 minutes. Sleep latency was increased at 61.9 minutes with/without the use of a sleep aid (type of sleep aid). REM latency was 68.0 minutes. Arousal index was normal/increased at 6.0 arousals per hour. Sleep efficiency was normal/increased/decreased at 85.4%. Wake after sleep onset was 8.0 minutes. The patient spent 1.3% of total sleep time in Stage N1, 64.4% in Stage N2, 13.3% in Stage N3, and 21.0% in REM. Time in REM supine was 70.0 minutes.    Respiration: No sleep-disordered breathing observed (AHI 0.9) without sleep-associated hypoxemia.    Events ? The polysomnogram revealed a presence of 4 obstructive, - central, and - mixed apneas resulting in an apnea index of 0.6 events per hour. There were 2 obstructive hypopneas and " - central hypopneas resulting in an obstructive hypopnea index of 0.3 and central hypopnea index of - events per hour. The combined apnea/hypopnea index was 0.9 events per hour (central apnea/hypopnea index was - events per hour). The REM AHI was 1.4 events per hour. The supine AHI was 1.3 events per hour. The RERA index was - events per hour.  The RDI was 0.9 events per hour.    Snoring - was reported as absent.    Respiratory rate and pattern - was notable for normal respiratory rate and pattern.    Sustained Sleep Associated Hypoventilation - Carbon dioxide monitoring was not used, however significant hypoventilation was not suggested by oximetry.    Sleep Associated Hypoxemia - (Greater than 5 minutes O2 sat at or below 88%) was not present. Baseline oxygen saturation was 96.4%. Lowest oxygen saturation was 89.1%. Time spent less than or equal to 88% was 0 minutes. Time spent less than or equal to 89% was 0 minutes.    Movement Activity: Nothing of note    Periodic Limb Activity - There were - PLMs during the entire study. The PLM index was - movements per hour. The PLM Arousal Index was - per hour.    REM EMG Activity - Excessive transient/sustained muscle activity was not present.    Nocturnal Behavior - Abnormal sleep related behaviors were not noted during/arising out of NREM / REM sleep.     Bruxism - None apparent.    Cardiac Summary: Appears NSR  The average pulse rate was 67.2 bpm. The minimum pulse rate was 49.0 bpm while the maximum pulse rate was 114.0 bpm.      Assessment:     Delayed sleep latency (noted for use of phone / laptop in bed and laptop was kept on after SO), normal arousal index.  Supine REM was observed.  Staging at times challenging given sweat artifact.    No sleep-disordered breathing observed (AHI 0.9) without sleep-associated hypoxemia.    Recommendations:    Advice regarding the risks of drowsy driving.    Suggest optimizing sleep hygiene, sleep schedule and avoiding sleep  deprivation.    Weight management (if BMI > 30).    Diagnostic Codes:   Unspecified Sleep Disturbance G47.9     _____________________________________   Electronically Signed By: Jovani Soliz MD (4/10/2022)

## 2022-04-11 ENCOUNTER — TELEPHONE (OUTPATIENT)
Dept: SLEEP MEDICINE | Facility: HOSPITAL | Age: 32
End: 2022-04-11
Payer: COMMERCIAL

## 2022-06-06 NOTE — PROGRESS NOTES
"Teddy Donis is a 31 year old male who is being evaluated via a billable video visit.       The patient has been notified of following:      \"This video visit will be conducted via a call between you and your physician/provider. We have found that certain health care needs can be provided without the need for an in-person physical exam.  This service lets us provide the care you need with a video conversation.  If a prescription is necessary we can send it directly to your pharmacy.  If lab work is needed we can place an order for that and you can then stop by our lab to have the test done at a later time.     Video visits are billed at different rates depending on your insurance coverage.  Please reach out to your insurance provider with any questions.     If during the course of the call the physician/provider feels a video visit is not appropriate, you will not be charged for this service.\"     Patient has given verbal consent for Video visit? Yes  How would you like to obtain your AVS? Mail a copy  If you are dropped from the video visit, the video invite should be resent to: Text to cell phone: -  Will anyone else be joining your video visit? No  If patient encounters technical issues they should call [phone]      Video-Visit Details     Type of service:  Video Visit     Video Start Time: 0830  Video End Time: 0900    Originating Location (pt. Location): Home     Distant Location (provider location):  [facility]      Platform used for Video Visit: [employer]    Virtual visit for review of sleep testing results.     Assessment:  - No sleep disordered breathing observed on PSG  - Noted for inadequate sleep hygiene with use of phone / laptop in bed on PSG  - [place] score of 5 not highly suggestive of organic hypersomnia  - Delayed sleep phase  - Overall, will focus initial treatment on behavioral modification for chronic insomnia to improve sleep consolidation and increase sleep " "time to see if improvement in fatigue.  Focus on sleep hygiene, sleep restriction.     Plan:  Delayed sleep phase  - Take 1-3 mg of melatonin at 8 pm every night  - Expose self to sun/bright light (light box) for 10-15 minutes every morning  - Minimize light exposure including electronics 30 minutes before bedtime  - Follow up in a month, consider checking vitamin level, thyroid functions, and hemoglobin if sleep doesn't improved      SUBJECTIVE:  Teddy JONO Donis is a 31 year old male.    PMHx of obesity, tobacco use.     Recently re-established care with Dr. Greenfield on 2/3/2022, concerns of hypersomnia with adequate sleep time.  Recommendation for sleep consult.    TODAY - Patient states that he is always tired regardless how many hours of sleep. 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 He noticed that these symptoms might have started 6 years ago (2016). Currently, he works 15-16 hrs a day due to a full time and side job, works from 7/8 am to 8 pm, and feels exhausted by bedtime. He struggles to get up in the morning during the weekdays. During weekends, he sleeps around midnight to 1 am, wakes up at 7 to 9 am naturally, and feels rested but would yawn throughout the day. Sleeping partner does not note any snoring or abnormal movement during sleep. No Fx of sleeping related problem.    Reviewed PSG results with patient.  Provided education on circadian rhythm and delayed sleep phase.     STOP-BANG score of 2-3, with unknown neck circumference.  [place] score of 5.  BMI of Estimated body mass index is 30.16 kg/m  as calculated from the following:    Height as of 12/21/20: 1.854 m (6' 1\").    Weight as of 2/3/22: 103.7 kg (228 lb 9.6 oz).      Per questionnaire: \"Sleep apnea\"     Caffeine use:  Yes for 3+ per day.  No for within 6 hours of bed.     Tobacco use: Yes     Sleep pattern:  Workdays.  9:30pm - 6:45am, total sleep " "time 7 hours.  Weekends.  10:30pm - 6:45am, total sleep time 7 hours.  Time to fall asleep: ~30-45 minutes.  Awakenings: \"very few\" times per night, 0 minutes to return to sleep.  Napping.  0-1 days per week, 1 hours per nap.     No for RLS screen.  No for sleep walking.  No for dream enactment behavior.  Yes for bruxism.     No for morning headaches.  Yes for snoring.  No for observed apnea.  Unknown for FHx of RUBÉN.     SHx:  , lives with wife, children and dogs.  Works as .    SLEEP STUDY INTERPRETATION  DIAGNOSTIC POLYSOMNOGRAPHY REPORT        Patient: ROLAND GALLAGHER  YOB: 1990  Study Date: 4/4/2022  MRN: [id number]  Referring Provider: Martin Greenfield MD  Ordering Provider: Jovani Soliz MD     Indications for Polysomnography: The patient is a 31 year old Male who is 6' 1\" and weighs 228.0 lbs. His BMI is 30.2, [place] sleepiness scale 5 and neck circumference is 15 cm. Relevant medical history includes obesity, tobacco use. A diagnostic polysomnogram was performed to evaluate for sleep apnea.     Polysomnogram Data: A full night polysomnogram recorded the standard physiologic parameters including EEG, EOG, EMG, ECG, nasal and oral airflow. Respiratory parameters of chest and abdominal movements were recorded with respiratory inductance plethysmography. Oxygen saturation was recorded by pulse oximetry. Hypopnea scoring rule used: 1B 4%.     Sleep Architecture: Delayed sleep latency (noted for use of phone / laptop in bed and laptop was kept on after SO), normal arousal index.  Supine REM was observed.  Staging at times challenging given sweat artifact.  The total recording time of the polysomnogram was 482.8 minutes. The total sleep time was 412.5 minutes. Sleep latency was increased at 61.9 minutes with/without the use of a sleep aid (type of sleep aid). REM latency was 68.0 minutes. Arousal index was normal/increased at 6.0 arousals per hour. Sleep efficiency was " normal/increased/decreased at 85.4%. Wake after sleep onset was 8.0 minutes. The patient spent 1.3% of total sleep time in Stage N1, 64.4% in Stage N2, 13.3% in Stage N3, and 21.0% in REM. Time in REM supine was 70.0 minutes.     Respiration: No sleep-disordered breathing observed (AHI 0.9) without sleep-associated hypoxemia.    Events ? The polysomnogram revealed a presence of 4 obstructive, - central, and - mixed apneas resulting in an apnea index of 0.6 events per hour. There were 2 obstructive hypopneas and - central hypopneas resulting in an obstructive hypopnea index of 0.3 and central hypopnea index of - events per hour. The combined apnea/hypopnea index was 0.9 events per hour (central apnea/hypopnea index was - events per hour). The REM AHI was 1.4 events per hour. The supine AHI was 1.3 events per hour. The RERA index was - events per hour.  The RDI was 0.9 events per hour.    Snoring - was reported as absent.    Respiratory rate and pattern - was notable for normal respiratory rate and pattern.    Sustained Sleep Associated Hypoventilation - Carbon dioxide monitoring was not used, however significant hypoventilation was not suggested by oximetry.    Sleep Associated Hypoxemia - (Greater than 5 minutes O2 sat at or below 88%) was not present. Baseline oxygen saturation was 96.4%. Lowest oxygen saturation was 89.1%. Time spent less than or equal to 88% was 0 minutes. Time spent less than or equal to 89% was 0 minutes.     Movement Activity: Nothing of note    Periodic Limb Activity - There were - PLMs during the entire study. The PLM index was - movements per hour. The PLM Arousal Index was - per hour.    REM EMG Activity - Excessive transient/sustained muscle activity was not present.    Nocturnal Behavior - Abnormal sleep related behaviors were not noted during/arising out of NREM / REM sleep.     Bruxism - None apparent.     Cardiac Summary: Appears NSR  The average pulse rate was 67.2 bpm. The minimum  pulse rate was 49.0 bpm while the maximum pulse rate was 114.0 bpm.       Assessment:     Delayed sleep latency (noted for use of phone / laptop in bed and laptop was kept on after SO), normal arousal index.  Supine REM was observed.  Staging at times challenging given sweat artifact.    No sleep-disordered breathing observed (AHI 0.9) without sleep-associated hypoxemia.     Recommendations:    Advice regarding the risks of drowsy driving.    Suggest optimizing sleep hygiene, sleep schedule and avoiding sleep deprivation.    Weight management (if BMI > 30).     Diagnostic Codes:   Unspecified Sleep Disturbance G47.9     _____________________________________   Electronically Signed By: Jovani Soliz MD (4/10/2022)         Past medical history:    There are no problems to display for this patient.      10 point ROS of systems including Constitutional, Eyes, Respiratory, Cardiovascular, Gastroenterology, Genitourinary, Integumentary, Muscularskeletal, Psychiatric were all negative except for pertinent positives noted in my HPI.    Current Outpatient Medications   Medication Sig Dispense Refill     nicotine (NICORETTE) 4 MG lozenge Place 1 lozenge (4 mg) inside cheek every hour as needed for smoking cessation 120 lozenge 4       OBJECTIVE:  There were no vitals taken for this visit.    Physical Exam     ---  This note was written with the assistance of the Dragon voice-dictation technology software. The final document, although reviewed, may contain errors. For corrections, please contact the office.    Jovani Soliz MD    Sleep Medicine  [place], [place]  ([phone])  [facility] [place], [place]  ([phone])  [facility] [place], [place] ([phone])    Time spent on the date of service:  35 minutes.

## 2022-06-07 ENCOUNTER — VIRTUAL VISIT (OUTPATIENT)
Dept: PULMONOLOGY | Facility: OTHER | Age: 32
End: 2022-06-07
Attending: FAMILY MEDICINE
Payer: COMMERCIAL

## 2022-06-07 VITALS — WEIGHT: 228 LBS | BODY MASS INDEX: 30.22 KG/M2 | HEIGHT: 73 IN

## 2022-06-07 DIAGNOSIS — R53.82 CHRONIC FATIGUE: ICD-10-CM

## 2022-06-07 DIAGNOSIS — G47.21 DELAYED SLEEP PHASE SYNDROME: Primary | ICD-10-CM

## 2022-06-07 PROCEDURE — 99203 OFFICE O/P NEW LOW 30 MIN: CPT | Mod: 95 | Performed by: FAMILY MEDICINE

## 2022-06-07 ASSESSMENT — SLEEP AND FATIGUE QUESTIONNAIRES
HOW LIKELY ARE YOU TO NOD OFF OR FALL ASLEEP WHILE SITTING QUIETLY AFTER LUNCH WITHOUT ALCOHOL: SLIGHT CHANCE OF DOZING
HOW LIKELY ARE YOU TO NOD OFF OR FALL ASLEEP WHILE SITTING INACTIVE IN A PUBLIC PLACE: SLIGHT CHANCE OF DOZING
HOW LIKELY ARE YOU TO NOD OFF OR FALL ASLEEP WHILE SITTING AND TALKING TO SOMEONE: WOULD NEVER DOZE
HOW LIKELY ARE YOU TO NOD OFF OR FALL ASLEEP WHILE WATCHING TV: MODERATE CHANCE OF DOZING
HOW LIKELY ARE YOU TO NOD OFF OR FALL ASLEEP WHEN YOU ARE A PASSENGER IN A CAR FOR AN HOUR WITHOUT A BREAK: SLIGHT CHANCE OF DOZING
HOW LIKELY ARE YOU TO NOD OFF OR FALL ASLEEP WHILE LYING DOWN TO REST IN THE AFTERNOON WHEN CIRCUMSTANCES PERMIT: MODERATE CHANCE OF DOZING
HOW LIKELY ARE YOU TO NOD OFF OR FALL ASLEEP IN A CAR, WHILE STOPPED FOR A FEW MINUTES IN TRAFFIC: WOULD NEVER DOZE
HOW LIKELY ARE YOU TO NOD OFF OR FALL ASLEEP WHILE SITTING AND READING: SLIGHT CHANCE OF DOZING

## 2022-06-07 NOTE — PROGRESS NOTES
"Teddy is a 31 year old who is being evaluated via a billable video visit.      How would you like to obtain your AVS? MyChart  If the video visit is dropped, the invitation should be resent by: Text to cell phone: [phone]  Will anyone else be joining your video visit? No  {If patient encounters technical issues they should call [phone] :[id number]}    Video Start Time: {video visit start/end time for provider to select:152948}  Video-Visit Details    Type of service:  Video Visit    Video End Time:{video visit start/end time for provider to select:152948}    Originating Location (pt. Location): {video visit patient location:617175::\"Home\"}    Distant Location (provider location):  [facility]     Platform used for Video Visit: {Virtual Visit Platforms:080156::\"AmWell\"}[place]  "

## 2022-06-07 NOTE — PATIENT INSTRUCTIONS
"Elías Mr. Donis,    I will include at the bottom of the message the generic summary of delayed sleep phase, but below we will summarize the specifics for you.    1.)  Take the low dose melatonin (between 1mg and 3mg) around 8pm    2.)  Avoid bright light exposure 1 hour before bed (includes screens like phones, laptop / computer)    3.)  Use the bright light box for at least 10-15 minutes first thing in the morning.  Below are a few options on [employer].  I am looking for strength of 10,000 Lux, all natural or \"white\" spectrum, UV free.  This is now pretty standard on all of these light boxes.    https://[employer]/Function-Adjustable-Brightness-Sunlight-Seasonal/dp/[email or web address]/ref=sr_1_2?crid=[email or web address]&eekyljvn=46482+lux+light+therapy+lamp&yqh=[id number]&mmqlmqx=72710+lux%2Caps%2C83&sr=8-2    https://[employer]/Therapy-[email or web address]-Function-Rotatable/dp/H21XO30PRS/ref=sr_1_5?crid=[email or web address]&yaaqvoqu=[email or web address]+lux+light+therapy+lamp&ttm=[id number]&efxsuef=04807+lux%2Caps%2C83&sr=8-5    4.)  Plan to follow up in 1 month.    Jovani Soliz MD    Sleep Medicine  [place], [place]  ([phone])  [facility] [place], [place]  ([phone])  [facility] [place], [place] ([phone])      WHAT IS DELAYED SLEEP PHASE SYNDROME?    Delayed sleep phase syndrome (DSPS) is a disorder in which the person's sleep-wake cycle (internal clock) is delayed by 2 or more hours.  For example, rather than falling asleep at 10pm and awakening at 7am, a person with DSPS will not fall asleep until atleast 12 am or later and then has great difficulty awakening at 7 am for school or work.  Most people with DSPS describe themselves as \"night owls\" and usually feel and function their best in the evening and nighttime hours.  They usually get much less sleep on weekdays, than on weekends or holidays.    Having DSPS, " "especially for children or adolescents who attend school, can cause significant problems, as they are unable to get up for school or work.  This often results in multiple school absences / tardiness or missed work, as well as daytime tiredness or trouble staying alert and attentive.    What can you do about it?    Sleep \"hygiene\" (Healthy sleep habits).  Avoid caffeine, cigarette smoking, alcohol, and other drugs.  Maintain a bedroom environment that is quiet and comfortable, and a bedtime routine that is calm and conducive to sleep.  Avoid stimulating activities before bed, such as vigorous exercise in the late evening (though regular exercise earlier in the day is good for sleep and general health!), avoid computer games and television at bedtime.  Keep light level low or dim close to bed time.    Bright light exposure in the morning, preferably sunlight, can help reset the internal clock.  It is hard for a \"night owl\" to awaken in a dark room in the morning.    Shifting the internal clock.  A) Gradually move morning wake up time 15-30 minutes earlier on successive mornings (or in intervals, every 3-5 mornings) over a period of days to a few weeks.  After several mornings in a row of earlier morning waking times, it will be easier to settle into sleep at a slightly earlier bedtime hour.  Use suggestions about light exposure (dim at night and bright in the morning) along with healthy sleep habits from above, while \"shifting the clock\".  B) For some patients: If days and nights are almost totally reversed, it may be necessary to shift the bedtime later and later, for example, if current bed time is 5 am, with wake-up time at 2 pm, it may be easier to push bedtime later and later (by 30-60 minutes on successive days) until the desired sleep schedule is reached.  Then LOCK that (usually more conventional) sleep schedule in place, using sleep habit tips above, with or without additional medications.    Melatonin.  The " "brain makes melatonin as a chemical signal to set the sleep-wake \"internal clock\".  It can be purchased over the counter (non prescription) and the usual starting dose is 1-3 mg 3-4 hours before the desired bed time.  It can be effective for many patients in establishing an earlier bedtime (that is, feeling sleepier at an earlier bedtime hour).  It is not currently FDA approved and is still a topic of active research in sleep medicine.    Bedtime sedative medication.  Your doctor may suggest a temporary trial of a bedtime sedative medication while the earlier bedtime is getting established.    "

## 2022-07-09 ENCOUNTER — HEALTH MAINTENANCE LETTER (OUTPATIENT)
Age: 32
End: 2022-07-09

## 2022-09-04 ENCOUNTER — HEALTH MAINTENANCE LETTER (OUTPATIENT)
Age: 32
End: 2022-09-04

## 2023-07-23 ENCOUNTER — HEALTH MAINTENANCE LETTER (OUTPATIENT)
Age: 33
End: 2023-07-23

## 2023-11-20 ENCOUNTER — APPOINTMENT (OUTPATIENT)
Dept: CHIROPRACTIC MEDICINE | Facility: OTHER | Age: 33
End: 2023-11-20

## 2023-11-20 ENCOUNTER — APPOINTMENT (OUTPATIENT)
Dept: OCCUPATIONAL MEDICINE | Facility: OTHER | Age: 33
End: 2023-11-20

## 2023-11-20 PROCEDURE — 99499 UNLISTED E&M SERVICE: CPT | Performed by: CHIROPRACTOR

## 2024-05-07 ENCOUNTER — E-VISIT (OUTPATIENT)
Dept: URGENT CARE | Facility: CLINIC | Age: 34
End: 2024-05-07
Payer: COMMERCIAL

## 2024-05-07 DIAGNOSIS — J06.9 VIRAL URI WITH COUGH: Primary | ICD-10-CM

## 2024-05-07 PROCEDURE — 99421 OL DIG E/M SVC 5-10 MIN: CPT | Performed by: EMERGENCY MEDICINE

## 2024-05-07 RX ORDER — GUAIFENESIN 1200 MG/1
1200 TABLET, EXTENDED RELEASE ORAL 2 TIMES DAILY
Qty: 60 TABLET | Refills: 0 | Status: SHIPPED | OUTPATIENT
Start: 2024-05-07

## 2024-05-07 RX ORDER — DEXTROMETHORPHAN POLISTIREX 30 MG/5ML
10 SUSPENSION ORAL 2 TIMES DAILY PRN
Qty: 89 ML | Refills: 0 | Status: SHIPPED | OUTPATIENT
Start: 2024-05-07

## 2024-05-07 NOTE — PATIENT INSTRUCTIONS
Viral Respiratory Infection: Care Instructions  Overview     A viral respiratory infection is an infection of the nose, sinuses, or throat caused by a virus. Colds and the flu are common types of viral respiratory infections.  The symptoms of a viral respiratory infection often start quickly. They include a fever, sore throat, and runny nose. You may also just not feel well. Or you may not want to eat much.  Most viral infections can be treated with home care. This may include drinking lots of fluids and taking over-the-counter pain medicine. You will probably feel better in 4 to 10 days.  Antibiotics are not used to treat a viral infection. Antibiotics don't kill viruses, so they won't help cure a viral illness.  In some cases, a doctor may prescribe antiviral medicine to help your body fight a serious viral infection.  Follow-up care is a key part of your treatment and safety. Be sure to make and go to all appointments, and call your doctor if you are having problems. It's also a good idea to know your test results and keep a list of the medicines you take.  How can you care for yourself at home?  To prevent dehydration, drink plenty of fluids. Choose water and other clear liquids until you feel better. If you have kidney, heart, or liver disease and have to limit fluids, talk with your doctor before you increase the amount of fluids you drink.  Ask your doctor if you can take an over-the-counter pain medicine, such as acetaminophen (Tylenol), ibuprofen (Advil, Motrin), or naproxen (Aleve). Be safe with medicines. Read and follow all instructions on the label. No one younger than 20 should take aspirin. It has been linked to Reye syndrome, a serious illness.  Be careful when taking over-the-counter cold or flu medicines and Tylenol at the same time. Many of these medicines have acetaminophen, which is Tylenol. Read the labels to make sure that you are not taking more than the recommended dose. Too much  "acetaminophen (Tylenol) can be harmful.  Get plenty of rest.  Use saline (saltwater) nasal washes to help keep your nasal passages open and wash out mucus and allergens. You can buy saline nose sprays at a grocery store or drugstore. Follow the instructions on the package. Or you can make your own at home. Add 1 teaspoon of non-iodized salt and 1 teaspoon of baking soda to 2 cups of distilled or boiled and cooled water. Fill a squeeze bottle or neti pot with the nasal wash. Then put the tip into your nostril, and lean over the sink. With your mouth open, gently squirt the liquid. Repeat on the other side.  Use a vaporizer or humidifier to add moisture to your bedroom. Follow the instructions for cleaning the machine.  Do not smoke or allow others to smoke around you. If you need help quitting, talk to your doctor about stop-smoking programs and medicines. These can increase your chances of quitting for good.  When should you call for help?   Call 911 anytime you think you may need emergency care. For example, call if:    You have severe trouble breathing.   Call your doctor now or seek immediate medical care if:    You have a new or higher fever.     Your fever lasts more than 48 hours.     You have trouble breathing.     You have a fever with a stiff neck or a severe headache.     You are sensitive to light.     You feel very sleepy or confused.   Watch closely for changes in your health, and be sure to contact your doctor if:    You do not get better as expected.   Where can you learn more?  Go to https://www.[employer].net/patiented  Enter Q795 in the search box to learn more about \"Viral Respiratory Infection: Care Instructions.\"  Current as of: June 12, 2023               Content Version: 14.0    4322-6700 [employer].   Care instructions adapted under license by your healthcare professional. If you have questions about a medical condition or this instruction, always ask your healthcare " professional. [employer] disclaims any warranty or liability for your use of this information.      Thank you for choosing us for your care. I have placed an order for a prescription so that you can start treatment. View your full visit summary for details by clicking on the link below. Your pharmacist will able to address any questions you may have about the medication.     If you're not feeling better within 5-7 days, please schedule an appointment.  You can schedule an appointment right here in [facility], or call [phone]  If the visit is for the same symptoms as your eVisit, we'll refund the cost of your eVisit if seen within seven days.

## 2024-09-14 ENCOUNTER — HEALTH MAINTENANCE LETTER (OUTPATIENT)
Age: 34
End: 2024-09-14

## 2025-07-20 SDOH — HEALTH STABILITY: PHYSICAL HEALTH: ON AVERAGE, HOW MANY DAYS PER WEEK DO YOU ENGAGE IN MODERATE TO STRENUOUS EXERCISE (LIKE A BRISK WALK)?: 4 DAYS

## 2025-07-20 SDOH — HEALTH STABILITY: PHYSICAL HEALTH: ON AVERAGE, HOW MANY MINUTES DO YOU ENGAGE IN EXERCISE AT THIS LEVEL?: 30 MIN

## 2025-07-20 ASSESSMENT — SOCIAL DETERMINANTS OF HEALTH (SDOH): HOW OFTEN DO YOU GET TOGETHER WITH FRIENDS OR RELATIVES?: TWICE A WEEK

## 2025-07-21 ENCOUNTER — OFFICE VISIT (OUTPATIENT)
Dept: FAMILY MEDICINE | Facility: OTHER | Age: 35
End: 2025-07-21
Attending: FAMILY MEDICINE
Payer: COMMERCIAL

## 2025-07-21 VITALS
BODY MASS INDEX: 30.62 KG/M2 | OXYGEN SATURATION: 99 % | WEIGHT: 231 LBS | DIASTOLIC BLOOD PRESSURE: 78 MMHG | HEART RATE: 77 BPM | TEMPERATURE: 97.9 F | SYSTOLIC BLOOD PRESSURE: 120 MMHG | HEIGHT: 73 IN

## 2025-07-21 DIAGNOSIS — R41.840 INATTENTION: ICD-10-CM

## 2025-07-21 DIAGNOSIS — Z13.220 LIPID SCREENING: ICD-10-CM

## 2025-07-21 DIAGNOSIS — N52.9 ERECTILE DYSFUNCTION, UNSPECIFIED ERECTILE DYSFUNCTION TYPE: ICD-10-CM

## 2025-07-21 DIAGNOSIS — Z00.00 ROUTINE GENERAL MEDICAL EXAMINATION AT A HEALTH CARE FACILITY: Primary | ICD-10-CM

## 2025-07-21 RX ORDER — SILDENAFIL 50 MG/1
50 TABLET, FILM COATED ORAL DAILY PRN
Qty: 30 TABLET | Refills: 1 | Status: SHIPPED | OUTPATIENT
Start: 2025-07-21

## 2025-07-21 RX ORDER — GUANFACINE 1 MG/1
1 TABLET ORAL AT BEDTIME
Qty: 30 TABLET | Refills: 1 | Status: SHIPPED | OUTPATIENT
Start: 2025-07-21

## 2025-07-21 ASSESSMENT — PAIN SCALES - GENERAL: PAINLEVEL_OUTOF10: NO PAIN (0)

## 2025-07-21 NOTE — PROGRESS NOTES
"Preventive Care Visit  [facility] [place]  Martin Greenfield MD, Family Medicine  Jul 21, 2025      Assessment & Plan     Lipid screening  Update and follow.    - Comprehensive metabolic panel; Future  - Lipid Profile; Future    Routine general medical examination at a health care facility  Doing well.  Inattention addressed today.  Full labs and follow.      Inattention  Nice review.  Son dx with adhd recently.  Trial of guanfacine.  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  I am willing to try ritalin LA 20 mg by phone/msg and he will let us know if he wants to go this route.  I would see him within a month after starting to reassess.  Risk/benefit discussed.    - guanFACINE (TENEX) 1 MG tablet; Take 1 tablet (1 mg) by mouth at bedtime.      Nicotine/Tobacco Cessation  He reports that he has been smoking cigarettes. His smokeless tobacco use includes chew.  Nicotine/Tobacco Cessation Plan  Information offered: Patient not interested at this time      BMI  Estimated body mass index is 30.48 kg/m  as calculated from the following:    Height as of this encounter: 1.854 m (6' 1\").    Weight as of this encounter: 104.8 kg (231 lb).       Counseling  Appropriate preventive services were addressed with this patient via screening, questionnaire, or discussion as appropriate for fall prevention, nutrition, physical activity, Tobacco-use cessation, social engagement, weight loss and cognition.  Checklist reviewing preventive services available has been given to the patient.  Reviewed patient's diet, addressing concerns and/or questions.   The patient was instructed to see the dentist every 6 months.   He is at risk for psychosocial distress and has been provided with information to reduce risk.   The patient reports drinking more than 3 alcoholic drinks per day and/or more than 7 drhnks per week. The patient was counseled and given information about possible harmful effects of excessive alcohol intake.Reviewed " preventive health counseling, as reflected in patient instructions        Angela Espinal is a 34 year old, presenting for the following:  Physical        7/21/2025     1:28 PM   Additional Questions   Roomed by Amy MALHOTRA         Advance Care Planning    Discussed advance care planning with patient; however, patient declined at this time.        7/20/2025   General Health   How would you rate your overall physical health? (!) FAIR   Feel stress (tense, anxious, or unable to sleep) To some extent   (!) STRESS CONCERN      7/20/2025   Nutrition   Three or more servings of calcium each day? Yes   Diet: Regular (no restrictions)   How many servings of fruit and vegetables per day? (!) 0-1   How many sweetened beverages each day? 0-1         7/20/2025   Exercise   Days per week of moderate/strenous exercise 4 days   Average minutes spent exercising at this level 30 min         7/20/2025   Social Factors   Frequency of gathering with friends or relatives Twice a week   Worry food won't last until get money to buy more No   Food not last or not have enough money for food? No   Do you have housing? (Housing is defined as stable permanent housing and does not include staying outside in a car, in a tent, in an abandoned building, in an overnight shelter, or couch-surfing.) Yes   Are you worried about losing your housing? No   Lack of transportation? No   Unable to get utilities (heat,electricity)? No         7/20/2025   Dental   Dentist two times every year? (!) NO         Today's PHQ-2 Score:       7/21/2025     1:26 PM   PHQ-2 ( 1999 [employer])   Q1: Little interest or pleasure in doing things 0    Q2: Feeling down, depressed or hopeless 0    PHQ-2 Score 0    Q1: Little interest or pleasure in doing things Not at all   Q2: Feeling down, depressed or hopeless Not at all   PHQ-2 Score 0       Proxy-reported           7/20/2025   Substance Use   Alcohol more than 3/day or more than 7/wk Yes   How often do you have a  drink containing alcohol 2 to 4 times a month   How many alcohol drinks on typical day 5 or 6   How often do you have 5+ drinks at one occasion Weekly   Audit 2/3 Score 5   How often not able to stop drinking once started Never   How often failed to do what normally expected Never   How often needed first drink in am after a heavy drinking session Never   How often feeling of guilt or remorse after drinking Never   How often unable to remember what happened the night before Never   Have you or someone else been injured because of your drinking No   Has anyone been concerned or suggested you cut down on drinking No   TOTAL SCORE - AUDIT 7   Do you use any other substances recreationally? No     Social History     Tobacco Use    Smoking status: Some Days     Types: Cigarettes    Smokeless tobacco: Current     Types: Chew   Substance Use Topics    Alcohol use: Yes             7/20/2025   One time HIV Screening   Previous HIV test? Yes         7/20/2025   STI Screening   New sexual partner(s) since last STI/HIV test? No         7/20/2025   Contraception/Family Planning   Questions about contraception or family planning No        Reviewed and updated as needed this visit by Provider                    No past medical history on file.  No past surgical history on file.      Review of Systems  CONSTITUTIONAL: NEGATIVE for fever, chills, change in weight  INTEGUMENTARY/SKIN: NEGATIVE for worrisome rashes, moles or lesions  EYES: NEGATIVE for vision changes or irritation  ENT/MOUTH: NEGATIVE for ear, mouth and throat problems  RESP: NEGATIVE for significant cough or SOB  BREAST: NEGATIVE for masses, tenderness or discharge  CV: NEGATIVE for chest pain, palpitations or peripheral edema  GI: NEGATIVE for nausea, abdominal pain, heartburn, or change in bowel habits  : NEGATIVE for frequency, dysuria, or hematuria  MUSCULOSKELETAL: NEGATIVE for significant arthralgias or myalgia  NEURO: NEGATIVE for weakness, dizziness or  "paresthesias  ENDOCRINE: NEGATIVE for temperature intolerance, skin/hair changes  HEME: NEGATIVE for bleeding problems  PSYCHIATRIC: NEGATIVE for changes in mood or affect     Objective    Exam  /78   Pulse 77   Temp 97.9  F (36.6  C) (Tympanic)   Ht 1.854 m (6' 1\")   Wt 104.8 kg (231 lb)   SpO2 99%   BMI 30.48 kg/m     Estimated body mass index is 30.48 kg/m  as calculated from the following:    Height as of this encounter: 1.854 m (6' 1\").    Weight as of this encounter: 104.8 kg (231 lb).    Physical Exam  GENERAL: alert and no distress  EYES: Eyes grossly normal to inspection, PERRL and conjunctivae and sclerae normal  HENT: ear canals and TM's normal, nose and mouth without ulcers or lesions  NECK: no adenopathy, no asymmetry, masses, or scars  RESP: lungs clear to auscultation - no rales, rhonchi or wheezes  CV: regular rate and rhythm, normal S1 S2, no S3 or S4, no murmur, click or rub, no peripheral edema  ABDOMEN: soft, nontender, no hepatosplenomegaly, no masses and bowel sounds normal   (male): normal male genitalia without lesions or urethral discharge, no hernia  MS: no gross musculoskeletal defects noted, no edema  SKIN: no suspicious lesions or rashes  NEURO: Normal strength and tone, mentation intact and speech normal  PSYCH: mentation appears normal, affect normal/bright    Full labs pending.      The longitudinal plan of care for the diagnosis(es)/condition(s) as documented were addressed during this visit. Due to the added complexity in care, I will continue to support Teddy in the subsequent management and with ongoing continuity of care.        Signed Electronically by: Martin Greenfield MD    "

## 2025-07-22 LAB
ALBUMIN SERPL BCG-MCNC: 4.6 G/DL (ref 3.5–5.2)
ALP SERPL-CCNC: 46 U/L (ref 40–150)
ALT SERPL W P-5'-P-CCNC: 23 U/L (ref 0–70)
ANION GAP SERPL CALCULATED.3IONS-SCNC: 12 MMOL/L (ref 7–15)
AST SERPL W P-5'-P-CCNC: 22 U/L (ref 0–45)
BILIRUB SERPL-MCNC: 0.6 MG/DL
BUN SERPL-MCNC: 9.3 MG/DL (ref 6–20)
CALCIUM SERPL-MCNC: 9.2 MG/DL (ref 8.8–10.4)
CHLORIDE SERPL-SCNC: 105 MMOL/L (ref 98–107)
CHOLEST SERPL-MCNC: 137 MG/DL
CREAT SERPL-MCNC: 0.89 MG/DL (ref 0.67–1.17)
EGFRCR SERPLBLD CKD-EPI 2021: >90 ML/MIN/1.73M2
FASTING STATUS PATIENT QL REPORTED: NO
FASTING STATUS PATIENT QL REPORTED: NO
GLUCOSE SERPL-MCNC: 93 MG/DL (ref 70–99)
HCO3 SERPL-SCNC: 24 MMOL/L (ref 22–29)
HDLC SERPL-MCNC: 61 MG/DL
LDLC SERPL CALC-MCNC: 70 MG/DL
NONHDLC SERPL-MCNC: 76 MG/DL
POTASSIUM SERPL-SCNC: 4.1 MMOL/L (ref 3.4–5.3)
PROT SERPL-MCNC: 6.9 G/DL (ref 6.4–8.3)
SODIUM SERPL-SCNC: 141 MMOL/L (ref 135–145)
TRIGL SERPL-MCNC: 32 MG/DL

## 2025-09-01 ENCOUNTER — MYC MEDICAL ADVICE (OUTPATIENT)
Dept: FAMILY MEDICINE | Facility: OTHER | Age: 35
End: 2025-09-01

## 2025-09-01 DIAGNOSIS — R41.840 INATTENTION: ICD-10-CM

## 2025-09-02 RX ORDER — METHYLPHENIDATE HYDROCHLORIDE 20 MG/1
20 CAPSULE, EXTENDED RELEASE ORAL DAILY
Qty: 30 CAPSULE | Refills: 0 | Status: SHIPPED | OUTPATIENT
Start: 2025-09-02